# Patient Record
Sex: MALE | Race: BLACK OR AFRICAN AMERICAN | NOT HISPANIC OR LATINO | Employment: OTHER | URBAN - METROPOLITAN AREA
[De-identification: names, ages, dates, MRNs, and addresses within clinical notes are randomized per-mention and may not be internally consistent; named-entity substitution may affect disease eponyms.]

---

## 2023-03-13 ENCOUNTER — OFFICE VISIT (OUTPATIENT)
Dept: RHEUMATOLOGY | Facility: CLINIC | Age: 53
End: 2023-03-13
Payer: MEDICARE

## 2023-03-13 ENCOUNTER — HOSPITAL ENCOUNTER (OUTPATIENT)
Dept: RADIOLOGY | Facility: HOSPITAL | Age: 53
Discharge: HOME | End: 2023-03-13
Attending: INTERNAL MEDICINE
Payer: MEDICARE

## 2023-03-13 VITALS
BODY MASS INDEX: 23.25 KG/M2 | TEMPERATURE: 99 F | HEIGHT: 68 IN | SYSTOLIC BLOOD PRESSURE: 118 MMHG | WEIGHT: 153.38 LBS | HEART RATE: 60 BPM | DIASTOLIC BLOOD PRESSURE: 73 MMHG | OXYGEN SATURATION: 98 %

## 2023-03-13 DIAGNOSIS — M50.20 CERVICAL DISC HERNIATION: ICD-10-CM

## 2023-03-13 DIAGNOSIS — M50.20 CERVICAL DISC HERNIATION: Primary | ICD-10-CM

## 2023-03-13 DIAGNOSIS — M51.16 RADICULOPATHY DUE TO LUMBAR INTERVERTEBRAL DISC DISORDER: ICD-10-CM

## 2023-03-13 DIAGNOSIS — M51.26 LUMBAR DISC HERNIATION: ICD-10-CM

## 2023-03-13 DIAGNOSIS — G47.00 INSOMNIA, UNSPECIFIED TYPE: ICD-10-CM

## 2023-03-13 DIAGNOSIS — M54.12 CERVICAL RADICULOPATHY: ICD-10-CM

## 2023-03-13 PROCEDURE — 99999 PR PBB SHADOW E&M-NEW PATIENT-LVL IV: ICD-10-PCS | Mod: PBBFAC,,, | Performed by: INTERNAL MEDICINE

## 2023-03-13 PROCEDURE — 1159F MED LIST DOCD IN RCRD: CPT | Mod: CPTII,S$GLB,, | Performed by: INTERNAL MEDICINE

## 2023-03-13 PROCEDURE — 3078F DIAST BP <80 MM HG: CPT | Mod: CPTII,S$GLB,, | Performed by: INTERNAL MEDICINE

## 2023-03-13 PROCEDURE — 99999 PR PBB SHADOW E&M-NEW PATIENT-LVL IV: CPT | Mod: PBBFAC,,, | Performed by: INTERNAL MEDICINE

## 2023-03-13 PROCEDURE — 99204 OFFICE O/P NEW MOD 45 MIN: CPT | Mod: S$GLB,,, | Performed by: INTERNAL MEDICINE

## 2023-03-13 PROCEDURE — 3074F PR MOST RECENT SYSTOLIC BLOOD PRESSURE < 130 MM HG: ICD-10-PCS | Mod: CPTII,S$GLB,, | Performed by: INTERNAL MEDICINE

## 2023-03-13 PROCEDURE — 3074F SYST BP LT 130 MM HG: CPT | Mod: CPTII,S$GLB,, | Performed by: INTERNAL MEDICINE

## 2023-03-13 PROCEDURE — 99204 PR OFFICE/OUTPT VISIT, NEW, LEVL IV, 45-59 MIN: ICD-10-PCS | Mod: S$GLB,,, | Performed by: INTERNAL MEDICINE

## 2023-03-13 PROCEDURE — 72202 X-RAY EXAM SI JOINTS 3/> VWS: CPT | Mod: TC

## 2023-03-13 PROCEDURE — 3078F PR MOST RECENT DIASTOLIC BLOOD PRESSURE < 80 MM HG: ICD-10-PCS | Mod: CPTII,S$GLB,, | Performed by: INTERNAL MEDICINE

## 2023-03-13 PROCEDURE — 3008F BODY MASS INDEX DOCD: CPT | Mod: CPTII,S$GLB,, | Performed by: INTERNAL MEDICINE

## 2023-03-13 PROCEDURE — 1159F PR MEDICATION LIST DOCUMENTED IN MEDICAL RECORD: ICD-10-PCS | Mod: CPTII,S$GLB,, | Performed by: INTERNAL MEDICINE

## 2023-03-13 PROCEDURE — 3008F PR BODY MASS INDEX (BMI) DOCUMENTED: ICD-10-PCS | Mod: CPTII,S$GLB,, | Performed by: INTERNAL MEDICINE

## 2023-03-13 RX ORDER — MELOXICAM 15 MG/1
15 TABLET ORAL DAILY
COMMUNITY
Start: 2023-02-24 | End: 2023-06-09

## 2023-03-13 RX ORDER — PREGABALIN 25 MG/1
75 CAPSULE ORAL 2 TIMES DAILY
Qty: 60 CAPSULE | Refills: 5 | Status: SHIPPED | OUTPATIENT
Start: 2023-03-13 | End: 2023-06-09

## 2023-03-13 RX ORDER — OMEPRAZOLE 40 MG/1
40 CAPSULE, DELAYED RELEASE ORAL EVERY MORNING
Qty: 30 CAPSULE | Refills: 11 | Status: SHIPPED | OUTPATIENT
Start: 2023-03-13 | End: 2023-06-09 | Stop reason: SDUPTHER

## 2023-03-13 RX ORDER — BACLOFEN 10 MG/1
10 TABLET ORAL 2 TIMES DAILY
COMMUNITY
Start: 2023-02-09

## 2023-03-13 RX ORDER — QUETIAPINE FUMARATE 50 MG/1
1-2 TABLET, FILM COATED ORAL NIGHTLY
COMMUNITY
Start: 2023-02-24

## 2023-03-13 RX ORDER — GLUCOSAMINE/D3/BOSWELLIA SERRA 1500MG-400
1 TABLET ORAL DAILY
COMMUNITY

## 2023-03-13 RX ORDER — HYDROCODONE BITARTRATE AND ACETAMINOPHEN 10; 325 MG/1; MG/1
1 TABLET ORAL 2 TIMES DAILY PRN
Qty: 60 TABLET | Refills: 0 | Status: SHIPPED | OUTPATIENT
Start: 2023-03-13 | End: 2023-04-05 | Stop reason: SDUPTHER

## 2023-03-13 RX ORDER — ASPIRIN 325 MG
1250 TABLET, DELAYED RELEASE (ENTERIC COATED) ORAL
COMMUNITY
End: 2023-06-09 | Stop reason: SDUPTHER

## 2023-03-13 RX ORDER — TIZANIDINE 2 MG/1
2 TABLET ORAL NIGHTLY
Qty: 30 TABLET | Refills: 5 | Status: SHIPPED | OUTPATIENT
Start: 2023-03-13 | End: 2023-06-09

## 2023-03-13 NOTE — PROGRESS NOTES
"Subjective:       Patient ID: Christophe Mandujano is a 52 y.o. male.    Chief Complaint: Initial Visit (INITIAL VISIT. PATIENT HERE FOR ARTHRITIS. COMPLAINS OF GENERALIZED JOINT PAIN. LOWER BACK DOWN THE LEFT LEG IS THE WORSE. TROUBLE SLEEPING DUE TO THE PAIN. PATIENT HAS A  STIMULATOR IN RIGHT SIDE OF LOWER BACK AND WAS GOING TO GET ONE PLACED IN THE LEFT TODAY BUT IT WAS RESCHEDULED. PAIN TODAY 8/10.)    Pt  is complaining of joint pain involving his spine arms and legs bilaterally.  Is 9/10 in intensity dull in quality and continuous.  It is associated with a morning stiffness lasting for more than 60 minutes. Pt reports having difficulty maintaining a good night of sleep and this has been associated with myalgia of 9/10 in intensity.  This pain is dull continuous and gets worse mainly at night.  It is associated with fatigue.  No fever no chills no others.        Review of Systems   Constitutional:  Negative for appetite change, chills and fever.   HENT:  Negative for congestion, ear pain, mouth sores, nosebleeds and trouble swallowing.    Eyes:  Negative for photophobia and discharge.   Respiratory:  Negative for chest tightness and shortness of breath.    Cardiovascular:  Negative for chest pain.   Gastrointestinal:  Negative for abdominal pain and vomiting.   Endocrine: Negative.    Genitourinary:  Negative for hematuria.   Musculoskeletal:         As per HPI   Skin:  Negative for rash.   Neurological:  Negative for weakness.       Objective:   /73 (BP Location: Left arm, Patient Position: Sitting, BP Method: Medium (Automatic))   Pulse 60   Temp 98.7 °F (37.1 °C) (Oral)   Ht 5' 8" (1.727 m)   Wt 69.6 kg (153 lb 6.4 oz)   SpO2 98%   BMI 23.32 kg/m²      Physical Exam   Constitutional: He is oriented to person, place, and time. He appears well-developed and well-nourished. No distress.   HENT:   Head: Normocephalic and atraumatic.   Right Ear: External ear normal.   Left Ear: External ear normal.   Eyes: " Pupils are equal, round, and reactive to light.   Cardiovascular: Normal rate, regular rhythm and normal heart sounds.   Pulmonary/Chest: Breath sounds normal.   Abdominal: Soft. There is no abdominal tenderness.   Musculoskeletal:      Right shoulder: Tenderness present.      Left shoulder: Tenderness present.      Right elbow: Tenderness present.      Left elbow: Tenderness present.      Right wrist: Tenderness present.      Left wrist: Tenderness present.      Cervical back: Neck supple.      Right hip: Tenderness present.      Left hip: Tenderness present.      Right knee: Tenderness present.      Left knee: Tenderness present.      Right ankle: Tenderness present.      Left ankle: Tenderness present.   Lymphadenopathy:     He has no cervical adenopathy.   Neurological: He is alert and oriented to person, place, and time. He displays normal reflexes. No cranial nerve deficit or sensory deficit. He exhibits normal muscle tone. Coordination normal.   Skin: No rash noted. No erythema.   Vitals reviewed.      Right Side Rheumatological Exam     The patient is tender to palpation of the shoulder, elbow, wrist, knee, 1st PIP, 1st MCP, 2nd PIP, 2nd MCP, 3rd PIP, 3rd MCP, 4th PIP, 4th MCP, 5th PIP, hip, ankle, 1st MTP, 2nd MTP, 3rd MTP, 4th MTP, 5th MTP, 1st toe IP, 2nd toe IP, 3rd toe IP, 4th toe IP and 5th toe IP    Left Side Rheumatological Exam     The patient is tender to palpation of the shoulder, elbow, wrist, knee, 1st PIP, 1st MCP, 2nd PIP, 2nd MCP, 3rd PIP, 3rd MCP, 4th PIP, 4th MCP, 5th PIP, 5th MCP, hip, ankle, 1st MTP, 2nd MTP, 3rd MTP, 4th MTP, 5th MTP, 1st toe IP, 2nd toe IP, 3rd toe IP, 4th toe IP and 5th toe IP.       Completed Fibromyalgia exam 18/18 tender points.  No data to display     Assessment:       1. Cervical disc herniation    2. Lumbar disc herniation    3. Cervical radiculopathy    4. Radiculopathy due to lumbar intervertebral disc disorder    5. Insomnia, unspecified type             Medication List with Changes/Refills   New Medications    HYDROCODONE-ACETAMINOPHEN (NORCO)  MG PER TABLET    Take 1 tablet by mouth 2 (two) times daily as needed for Pain.       Start Date: 3/13/2023 End Date: 4/12/2023    OMEPRAZOLE (PRILOSEC) 40 MG CAPSULE    Take 1 capsule (40 mg total) by mouth every morning.       Start Date: 3/13/2023 End Date: 3/12/2024    PREGABALIN (LYRICA) 25 MG CAPSULE    Take 3 capsules (75 mg total) by mouth 2 (two) times daily.       Start Date: 3/13/2023 End Date: 9/11/2023    TIZANIDINE (ZANAFLEX) 2 MG TABLET    Take 1 tablet (2 mg total) by mouth nightly.       Start Date: 3/13/2023 End Date: 9/9/2023   Current Medications    BACLOFEN (LIORESAL) 10 MG TABLET    Take 10 mg by mouth 2 (two) times daily.       Start Date: 2/9/2023  End Date: --    CHOLECALCIFEROL, VITAMIN D3, 1,250 MCG (50,000 UNIT) CAPSULE    Take 1,250 mcg by mouth every 7 days.       Start Date: --        End Date: --    GLUCOSAMINE-D3-BOSWELLIA SERR (OSTEO BI-FLEX, 5-LOXIN,) 1,500-400-100 MG-UNIT-MG TAB    Take 1 tablet by mouth once daily.       Start Date: --        End Date: --    MELOXICAM (MOBIC) 15 MG TABLET    Take 15 mg by mouth once daily.       Start Date: 2/24/2023 End Date: --    QUETIAPINE (SEROQUEL) 50 MG TABLET    Take 1-2 tablets by mouth every evening.       Start Date: 2/24/2023 End Date: --         Plan:         Problem List Items Addressed This Visit    None  Visit Diagnoses       Cervical disc herniation    -  Primary    Relevant Medications    pregabalin (LYRICA) 25 MG capsule    omeprazole (PRILOSEC) 40 MG capsule    HYDROcodone-acetaminophen (NORCO)  mg per tablet    tiZANidine (ZANAFLEX) 2 MG tablet    Other Relevant Orders    X-Ray Sacroiliac Joints Complete    Lumbar disc herniation        Relevant Medications    pregabalin (LYRICA) 25 MG capsule    omeprazole (PRILOSEC) 40 MG capsule    HYDROcodone-acetaminophen (NORCO)  mg per tablet    tiZANidine (ZANAFLEX) 2 MG  tablet    Other Relevant Orders    X-Ray Sacroiliac Joints Complete    Cervical radiculopathy        Relevant Medications    pregabalin (LYRICA) 25 MG capsule    omeprazole (PRILOSEC) 40 MG capsule    HYDROcodone-acetaminophen (NORCO)  mg per tablet    tiZANidine (ZANAFLEX) 2 MG tablet    Other Relevant Orders    X-Ray Sacroiliac Joints Complete    Radiculopathy due to lumbar intervertebral disc disorder        Relevant Medications    pregabalin (LYRICA) 25 MG capsule    omeprazole (PRILOSEC) 40 MG capsule    HYDROcodone-acetaminophen (NORCO)  mg per tablet    tiZANidine (ZANAFLEX) 2 MG tablet    Other Relevant Orders    X-Ray Sacroiliac Joints Complete    Insomnia, unspecified type        Relevant Medications    pregabalin (LYRICA) 25 MG capsule    omeprazole (PRILOSEC) 40 MG capsule    HYDROcodone-acetaminophen (NORCO)  mg per tablet    tiZANidine (ZANAFLEX) 2 MG tablet    Other Relevant Orders    X-Ray Sacroiliac Joints Complete

## 2023-04-05 DIAGNOSIS — G47.00 INSOMNIA, UNSPECIFIED TYPE: ICD-10-CM

## 2023-04-05 DIAGNOSIS — M51.26 LUMBAR DISC HERNIATION: ICD-10-CM

## 2023-04-05 DIAGNOSIS — M50.20 CERVICAL DISC HERNIATION: ICD-10-CM

## 2023-04-05 DIAGNOSIS — M51.16 RADICULOPATHY DUE TO LUMBAR INTERVERTEBRAL DISC DISORDER: ICD-10-CM

## 2023-04-05 DIAGNOSIS — M54.12 CERVICAL RADICULOPATHY: ICD-10-CM

## 2023-04-05 RX ORDER — HYDROCODONE BITARTRATE AND ACETAMINOPHEN 10; 325 MG/1; MG/1
1 TABLET ORAL 2 TIMES DAILY PRN
Qty: 60 TABLET | Refills: 0 | Status: SHIPPED | OUTPATIENT
Start: 2023-04-05 | End: 2023-05-05 | Stop reason: SDUPTHER

## 2023-05-05 DIAGNOSIS — M51.26 LUMBAR DISC HERNIATION: ICD-10-CM

## 2023-05-05 DIAGNOSIS — M51.16 RADICULOPATHY DUE TO LUMBAR INTERVERTEBRAL DISC DISORDER: ICD-10-CM

## 2023-05-05 DIAGNOSIS — M50.20 CERVICAL DISC HERNIATION: ICD-10-CM

## 2023-05-05 DIAGNOSIS — M54.12 CERVICAL RADICULOPATHY: ICD-10-CM

## 2023-05-05 DIAGNOSIS — G47.00 INSOMNIA, UNSPECIFIED TYPE: ICD-10-CM

## 2023-05-05 RX ORDER — HYDROCODONE BITARTRATE AND ACETAMINOPHEN 10; 325 MG/1; MG/1
1 TABLET ORAL 2 TIMES DAILY PRN
Qty: 60 TABLET | Refills: 0 | Status: SHIPPED | OUTPATIENT
Start: 2023-05-05 | End: 2023-06-04

## 2023-06-09 ENCOUNTER — OFFICE VISIT (OUTPATIENT)
Dept: RHEUMATOLOGY | Facility: CLINIC | Age: 53
End: 2023-06-09
Payer: MEDICARE

## 2023-06-09 VITALS
BODY MASS INDEX: 24.89 KG/M2 | DIASTOLIC BLOOD PRESSURE: 81 MMHG | HEART RATE: 71 BPM | SYSTOLIC BLOOD PRESSURE: 127 MMHG | WEIGHT: 164.19 LBS | HEIGHT: 68 IN | OXYGEN SATURATION: 97 % | TEMPERATURE: 98 F

## 2023-06-09 DIAGNOSIS — G47.00 INSOMNIA, UNSPECIFIED TYPE: ICD-10-CM

## 2023-06-09 DIAGNOSIS — M54.16 RADICULOPATHY, LUMBAR REGION: ICD-10-CM

## 2023-06-09 DIAGNOSIS — M51.26 LUMBAR DISC HERNIATION: ICD-10-CM

## 2023-06-09 DIAGNOSIS — M50.20 CERVICAL DISC HERNIATION: ICD-10-CM

## 2023-06-09 DIAGNOSIS — M45.0 ANKYLOSING SPONDYLITIS OF MULTIPLE SITES IN SPINE: Primary | ICD-10-CM

## 2023-06-09 PROCEDURE — 3008F PR BODY MASS INDEX (BMI) DOCUMENTED: ICD-10-PCS | Mod: CPTII,S$GLB,, | Performed by: INTERNAL MEDICINE

## 2023-06-09 PROCEDURE — 3074F PR MOST RECENT SYSTOLIC BLOOD PRESSURE < 130 MM HG: ICD-10-PCS | Mod: CPTII,S$GLB,, | Performed by: INTERNAL MEDICINE

## 2023-06-09 PROCEDURE — 1159F MED LIST DOCD IN RCRD: CPT | Mod: CPTII,S$GLB,, | Performed by: INTERNAL MEDICINE

## 2023-06-09 PROCEDURE — 99999 PR PBB SHADOW E&M-EST. PATIENT-LVL III: CPT | Mod: PBBFAC,,, | Performed by: INTERNAL MEDICINE

## 2023-06-09 PROCEDURE — 3074F SYST BP LT 130 MM HG: CPT | Mod: CPTII,S$GLB,, | Performed by: INTERNAL MEDICINE

## 2023-06-09 PROCEDURE — 1159F PR MEDICATION LIST DOCUMENTED IN MEDICAL RECORD: ICD-10-PCS | Mod: CPTII,S$GLB,, | Performed by: INTERNAL MEDICINE

## 2023-06-09 PROCEDURE — 99999 PR PBB SHADOW E&M-EST. PATIENT-LVL III: ICD-10-PCS | Mod: PBBFAC,,, | Performed by: INTERNAL MEDICINE

## 2023-06-09 PROCEDURE — 3079F PR MOST RECENT DIASTOLIC BLOOD PRESSURE 80-89 MM HG: ICD-10-PCS | Mod: CPTII,S$GLB,, | Performed by: INTERNAL MEDICINE

## 2023-06-09 PROCEDURE — 3008F BODY MASS INDEX DOCD: CPT | Mod: CPTII,S$GLB,, | Performed by: INTERNAL MEDICINE

## 2023-06-09 PROCEDURE — 99214 PR OFFICE/OUTPT VISIT, EST, LEVL IV, 30-39 MIN: ICD-10-PCS | Mod: S$GLB,,, | Performed by: INTERNAL MEDICINE

## 2023-06-09 PROCEDURE — 3079F DIAST BP 80-89 MM HG: CPT | Mod: CPTII,S$GLB,, | Performed by: INTERNAL MEDICINE

## 2023-06-09 PROCEDURE — 99214 OFFICE O/P EST MOD 30 MIN: CPT | Mod: S$GLB,,, | Performed by: INTERNAL MEDICINE

## 2023-06-09 RX ORDER — HYDROCODONE BITARTRATE AND ACETAMINOPHEN 10; 325 MG/1; MG/1
1 TABLET ORAL
COMMUNITY
End: 2023-06-09 | Stop reason: SDUPTHER

## 2023-06-09 RX ORDER — ASPIRIN 325 MG
50000 TABLET, DELAYED RELEASE (ENTERIC COATED) ORAL
Qty: 5 CAPSULE | Refills: 5 | Status: SHIPPED | OUTPATIENT
Start: 2023-06-09 | End: 2023-10-02 | Stop reason: SDUPTHER

## 2023-06-09 RX ORDER — HYDROCODONE BITARTRATE AND ACETAMINOPHEN 10; 325 MG/1; MG/1
1 TABLET ORAL 3 TIMES DAILY PRN
Qty: 90 TABLET | Refills: 0 | Status: SHIPPED | OUTPATIENT
Start: 2023-06-09 | End: 2023-07-05 | Stop reason: SDUPTHER

## 2023-06-09 RX ORDER — METHOTREXATE 2.5 MG/1
10 TABLET ORAL
Qty: 20 TABLET | Refills: 5 | Status: SHIPPED | OUTPATIENT
Start: 2023-06-09 | End: 2023-10-02 | Stop reason: SDUPTHER

## 2023-06-09 RX ORDER — CELECOXIB 200 MG/1
200 CAPSULE ORAL
Qty: 30 CAPSULE | Refills: 5 | Status: SHIPPED | OUTPATIENT
Start: 2023-06-09 | End: 2023-10-02 | Stop reason: SDUPTHER

## 2023-06-09 RX ORDER — FOLIC ACID 1 MG/1
1 TABLET ORAL DAILY
Qty: 30 TABLET | Refills: 5 | Status: SHIPPED | OUTPATIENT
Start: 2023-06-09 | End: 2023-10-02 | Stop reason: SDUPTHER

## 2023-06-09 RX ORDER — OMEPRAZOLE 40 MG/1
40 CAPSULE, DELAYED RELEASE ORAL EVERY MORNING
Qty: 30 CAPSULE | Refills: 11 | Status: SHIPPED | OUTPATIENT
Start: 2023-06-09 | End: 2023-10-02 | Stop reason: SDUPTHER

## 2023-06-09 NOTE — PROGRESS NOTES
"Subjective:       Patient ID: Christophe Mandujano is a 52 y.o. male.    Chief Complaint: Pain (Pain in the waist and in the back of the legs. )    The patient is complaining of joint pain involving the MCP PIP wrist elbow shoulders hips knees and ankles bilaterally.  The pain is 8/10 in intensity dull in quality and continuous.  That is associated with a morning stiffness lasting for more than 60 minutes.  Is also having difficulty maintaining a good night of sleep.  This has been associated with myalgias.  Muscle aches are 8/10 in intensity dull in quality and continuous.  They are associated with fatigue.  No fever no chills no others.  Xrays SI  checked during this visit . SACROILIITIS MAGUI ON XRAYS       Review of Systems   Constitutional:  Negative for appetite change, chills and fever.   HENT:  Negative for congestion, ear pain, mouth sores, nosebleeds and trouble swallowing.    Eyes:  Negative for photophobia and discharge.   Respiratory:  Negative for chest tightness and shortness of breath.    Cardiovascular:  Negative for chest pain.   Gastrointestinal:  Negative for abdominal pain and vomiting.   Endocrine: Negative.    Genitourinary:  Negative for hematuria.   Musculoskeletal:         As per HPI   Skin:  Negative for rash.   Neurological:  Negative for weakness.       Objective:   /81 (BP Location: Left arm)   Pulse 71   Temp 97.7 °F (36.5 °C) (Oral)   Ht 5' 8" (1.727 m)   Wt 74.5 kg (164 lb 3.2 oz)   SpO2 97%   BMI 24.97 kg/m²      Physical Exam   Constitutional: He is oriented to person, place, and time. He appears well-developed and well-nourished. No distress.   HENT:   Head: Normocephalic and atraumatic.   Right Ear: External ear normal.   Left Ear: External ear normal.   Eyes: Pupils are equal, round, and reactive to light.   Cardiovascular: Normal rate, regular rhythm and normal heart sounds.   Pulmonary/Chest: Breath sounds normal.   Abdominal: Soft. There is no abdominal tenderness. "   Musculoskeletal:      Right shoulder: Tenderness present.      Left shoulder: Tenderness present.      Right elbow: Tenderness present.      Left elbow: Tenderness present.      Right wrist: Tenderness present.      Left wrist: Tenderness present.      Cervical back: Neck supple.      Right hip: Tenderness present.      Left hip: Tenderness present.      Right knee: Tenderness present.      Left knee: Tenderness present.      Right ankle: Tenderness present.      Left ankle: Tenderness present.   Lymphadenopathy:     He has no cervical adenopathy.   Neurological: He is alert and oriented to person, place, and time. He displays normal reflexes. No cranial nerve deficit or sensory deficit. He exhibits normal muscle tone. Coordination normal.   Skin: No rash noted. No erythema.   Vitals reviewed.      Right Side Rheumatological Exam     The patient is tender to palpation of the shoulder, elbow, wrist, knee, 1st PIP, 1st MCP, 2nd PIP, 2nd MCP, 3rd PIP, 3rd MCP, 4th PIP, 4th MCP, 5th PIP, hip, ankle, 1st MTP, 2nd MTP, 3rd MTP, 4th MTP, 5th MTP, 1st toe IP, 2nd toe IP, 3rd toe IP, 4th toe IP and 5th toe IP    Left Side Rheumatological Exam     The patient is tender to palpation of the shoulder, elbow, wrist, knee, 1st PIP, 1st MCP, 2nd PIP, 2nd MCP, 3rd PIP, 3rd MCP, 4th PIP, 4th MCP, 5th PIP, 5th MCP, hip, ankle, 1st MTP, 2nd MTP, 3rd MTP, 4th MTP, 5th MTP, 1st toe IP, 2nd toe IP, 3rd toe IP, 4th toe IP and 5th toe IP.       Completed Fibromyalgia exam 18/18 tender points.  No data to display     Assessment:       1. Ankylosing spondylitis of multiple sites in spine    2. Cervical disc herniation    3. Lumbar disc herniation    4. Insomnia, unspecified type    5. Radiculopathy, lumbar region          Medication List with Changes/Refills   New Medications    CELECOXIB (CELEBREX) 200 MG CAPSULE    Take 1 capsule (200 mg total) by mouth daily with breakfast.       Start Date: 6/9/2023  End Date: --    FOLIC ACID (FOLVITE)  1 MG TABLET    Take 1 tablet (1 mg total) by mouth once daily. After food       Start Date: 6/9/2023  End Date: 12/6/2023    METHOTREXATE 2.5 MG TAB    Take 4 tablets (10 mg total) by mouth every 7 days. EVERY    FRIDAY    TAKE 4 TAB TOGETHER AFTER SUPPER       Start Date: 6/9/2023  End Date: 12/6/2023   Current Medications    BACLOFEN (LIORESAL) 10 MG TABLET    Take 10 mg by mouth 2 (two) times daily.       Start Date: 2/9/2023  End Date: --    GLUCOSAMINE-D3-BOSWELLIA SERR 1,500-400-100 MG-UNIT-MG TAB    Take 1 tablet by mouth once daily.       Start Date: --        End Date: --    QUETIAPINE (SEROQUEL) 50 MG TABLET    Take 1-2 tablets by mouth every evening.       Start Date: 2/24/2023 End Date: --   Changed and/or Refilled Medications    Modified Medication Previous Medication    CHOLECALCIFEROL, VITAMIN D3, 1,250 MCG (50,000 UNIT) CAPSULE cholecalciferol, vitamin D3, 1,250 mcg (50,000 unit) capsule       Take 1 capsule (50,000 Units total) by mouth every 7 days.    Take 1,250 mcg by mouth every 7 days.       Start Date: 6/9/2023  End Date: --    Start Date: --        End Date: 6/9/2023    HYDROCODONE-ACETAMINOPHEN (NORCO)  MG PER TABLET HYDROcodone-acetaminophen (NORCO)  mg per tablet       Take 1 tablet by mouth 3 (three) times daily as needed for Pain.    Take 1 tablet by mouth.       Start Date: 6/9/2023  End Date: --    Start Date: --        End Date: 6/9/2023    OMEPRAZOLE (PRILOSEC) 40 MG CAPSULE omeprazole (PRILOSEC) 40 MG capsule       Take 1 capsule (40 mg total) by mouth every morning. Before breakfast    Take 1 capsule (40 mg total) by mouth every morning.       Start Date: 6/9/2023  End Date: 6/8/2024    Start Date: 3/13/2023 End Date: 6/9/2023   Discontinued Medications    MELOXICAM (MOBIC) 15 MG TABLET    Take 15 mg by mouth once daily.       Start Date: 2/24/2023 End Date: 6/9/2023    PREGABALIN (LYRICA) 25 MG CAPSULE    Take 3 capsules (75 mg total) by mouth 2 (two) times daily.        Start Date: 3/13/2023 End Date: 6/9/2023    TIZANIDINE (ZANAFLEX) 2 MG TABLET    Take 1 tablet (2 mg total) by mouth nightly.       Start Date: 3/13/2023 End Date: 6/9/2023         Plan:         Problem List Items Addressed This Visit          Neuro    Cervical disc herniation    Relevant Medications    cholecalciferol, vitamin D3, 1,250 mcg (50,000 unit) capsule    folic acid (FOLVITE) 1 MG tablet    methotrexate 2.5 MG Tab    celecoxib (CELEBREX) 200 MG capsule    omeprazole (PRILOSEC) 40 MG capsule    HYDROcodone-acetaminophen (NORCO)  mg per tablet    Other Relevant Orders    CBC Auto Differential    Comprehensive Metabolic Panel    CRP, High Sensitivity    Vitamin D    Rheumatoid Quantitative    Cyclic Citrullinated Peptide Antibody, IgG    Antinuclear Ab, HEp-2 Substrate    Hepatitis B Surface Antigen    Hepatitis C Antibody    TSH    T4, Free    HLA B27 Antigen    Lumbar disc herniation    Relevant Medications    cholecalciferol, vitamin D3, 1,250 mcg (50,000 unit) capsule    folic acid (FOLVITE) 1 MG tablet    methotrexate 2.5 MG Tab    celecoxib (CELEBREX) 200 MG capsule    omeprazole (PRILOSEC) 40 MG capsule    HYDROcodone-acetaminophen (NORCO)  mg per tablet    Other Relevant Orders    CBC Auto Differential    Comprehensive Metabolic Panel    CRP, High Sensitivity    Vitamin D    Rheumatoid Quantitative    Cyclic Citrullinated Peptide Antibody, IgG    Antinuclear Ab, HEp-2 Substrate    Hepatitis B Surface Antigen    Hepatitis C Antibody    TSH    T4, Free    HLA B27 Antigen    Radiculopathy, lumbar region    Relevant Medications    cholecalciferol, vitamin D3, 1,250 mcg (50,000 unit) capsule    folic acid (FOLVITE) 1 MG tablet    methotrexate 2.5 MG Tab    celecoxib (CELEBREX) 200 MG capsule    omeprazole (PRILOSEC) 40 MG capsule    HYDROcodone-acetaminophen (NORCO)  mg per tablet    Other Relevant Orders    CBC Auto Differential    Comprehensive Metabolic Panel    CRP, High Sensitivity     Vitamin D    Rheumatoid Quantitative    Cyclic Citrullinated Peptide Antibody, IgG    Antinuclear Ab, HEp-2 Substrate    Hepatitis B Surface Antigen    Hepatitis C Antibody    TSH    T4, Free    HLA B27 Antigen       Orthopedic    Ankylosing spondylitis of multiple sites in spine - Primary    Relevant Medications    cholecalciferol, vitamin D3, 1,250 mcg (50,000 unit) capsule    folic acid (FOLVITE) 1 MG tablet    methotrexate 2.5 MG Tab    celecoxib (CELEBREX) 200 MG capsule    omeprazole (PRILOSEC) 40 MG capsule    HYDROcodone-acetaminophen (NORCO)  mg per tablet    Other Relevant Orders    CBC Auto Differential    Comprehensive Metabolic Panel    CRP, High Sensitivity    Vitamin D    Rheumatoid Quantitative    Cyclic Citrullinated Peptide Antibody, IgG    Antinuclear Ab, HEp-2 Substrate    Hepatitis B Surface Antigen    Hepatitis C Antibody    TSH    T4, Free    HLA B27 Antigen       Other    Insomnia    Relevant Medications    cholecalciferol, vitamin D3, 1,250 mcg (50,000 unit) capsule    folic acid (FOLVITE) 1 MG tablet    methotrexate 2.5 MG Tab    celecoxib (CELEBREX) 200 MG capsule    omeprazole (PRILOSEC) 40 MG capsule    HYDROcodone-acetaminophen (NORCO)  mg per tablet    Other Relevant Orders    CBC Auto Differential    Comprehensive Metabolic Panel    CRP, High Sensitivity    Vitamin D    Rheumatoid Quantitative    Cyclic Citrullinated Peptide Antibody, IgG    Antinuclear Ab, HEp-2 Substrate    Hepatitis B Surface Antigen    Hepatitis C Antibody    TSH    T4, Free    HLA B27 Antigen

## 2023-07-05 DIAGNOSIS — M45.0 ANKYLOSING SPONDYLITIS OF MULTIPLE SITES IN SPINE: ICD-10-CM

## 2023-07-05 DIAGNOSIS — M50.20 CERVICAL DISC HERNIATION: ICD-10-CM

## 2023-07-05 DIAGNOSIS — G47.00 INSOMNIA, UNSPECIFIED TYPE: ICD-10-CM

## 2023-07-05 DIAGNOSIS — M51.26 LUMBAR DISC HERNIATION: ICD-10-CM

## 2023-07-05 DIAGNOSIS — M54.16 RADICULOPATHY, LUMBAR REGION: ICD-10-CM

## 2023-07-05 RX ORDER — HYDROCODONE BITARTRATE AND ACETAMINOPHEN 10; 325 MG/1; MG/1
1 TABLET ORAL 3 TIMES DAILY PRN
Qty: 90 TABLET | Refills: 0 | Status: SHIPPED | OUTPATIENT
Start: 2023-07-05 | End: 2023-08-04 | Stop reason: SDUPTHER

## 2023-08-04 DIAGNOSIS — M54.16 RADICULOPATHY, LUMBAR REGION: ICD-10-CM

## 2023-08-04 DIAGNOSIS — M45.0 ANKYLOSING SPONDYLITIS OF MULTIPLE SITES IN SPINE: ICD-10-CM

## 2023-08-04 DIAGNOSIS — M51.26 LUMBAR DISC HERNIATION: ICD-10-CM

## 2023-08-04 DIAGNOSIS — M50.20 CERVICAL DISC HERNIATION: ICD-10-CM

## 2023-08-04 DIAGNOSIS — G47.00 INSOMNIA, UNSPECIFIED TYPE: ICD-10-CM

## 2023-08-04 RX ORDER — HYDROCODONE BITARTRATE AND ACETAMINOPHEN 10; 325 MG/1; MG/1
1 TABLET ORAL 3 TIMES DAILY PRN
Qty: 90 TABLET | Refills: 0 | Status: SHIPPED | OUTPATIENT
Start: 2023-08-04 | End: 2023-09-05 | Stop reason: SDUPTHER

## 2023-09-05 DIAGNOSIS — M51.26 LUMBAR DISC HERNIATION: ICD-10-CM

## 2023-09-05 DIAGNOSIS — M54.16 RADICULOPATHY, LUMBAR REGION: ICD-10-CM

## 2023-09-05 DIAGNOSIS — M50.20 CERVICAL DISC HERNIATION: ICD-10-CM

## 2023-09-05 DIAGNOSIS — M45.0 ANKYLOSING SPONDYLITIS OF MULTIPLE SITES IN SPINE: ICD-10-CM

## 2023-09-05 DIAGNOSIS — G47.00 INSOMNIA, UNSPECIFIED TYPE: ICD-10-CM

## 2023-09-05 RX ORDER — HYDROCODONE BITARTRATE AND ACETAMINOPHEN 10; 325 MG/1; MG/1
1 TABLET ORAL 3 TIMES DAILY PRN
Qty: 90 TABLET | Refills: 0 | Status: SHIPPED | OUTPATIENT
Start: 2023-09-05 | End: 2023-10-02 | Stop reason: SDUPTHER

## 2023-09-05 NOTE — TELEPHONE ENCOUNTER
----- Message from Isa Rodríguez sent at 9/5/2023  8:20 AM CDT -----  Regarding: Medication refill  Patient called requesting a refill for [facility] in [place].

## 2023-09-29 PROBLEM — E55.9 VITAMIN D DEFICIENCY: Status: ACTIVE | Noted: 2023-09-29

## 2023-09-29 NOTE — ASSESSMENT & PLAN NOTE
Has noticed small improvement since starting the MTX. He is also having joint pain in MCP PIP. Morning stiffness about 60 minutes.    Increase methotrexate to 15 mg Q 7 days  Continue folic acid daily  Continue Celebrex 200 mg with breakfast  Continue omeprazole 40 mg daily    Currently on Norco 05413 mg t.i.d. p.r.n. Rx by MD    Labs ordered today for continued monitoring including Quantiferon TB Test for potential future biologic treatment

## 2023-09-29 NOTE — PROGRESS NOTES
Subjective:           Patient ID: Christophe Mandujano is a 52 y.o. male.    Chief Complaint: Follow-up (Lower back pain ,/Bilateral hip pain )      Mr. Mandujano is a 53 y/o male here for a f./u. He was initially referred for eval of generalized joint pain. Followed by [facility] for his back, he has a stimulator in his back and gets injection intermittently with not much relief. He has hx of multiple back surgeries. He established care with Dr. Chavira on 3/13/23 . Past labs: ELOISE neg, RF Neg, CCP Neg, HLA- B27 neg.  X-ray of sacroiliac showed bilateral sacroiliitis giving him the diagnosis of ankylosing spondylitis.  At his last office visit on 06/09/2023 he was initiated on methotrexate. He does report slight improvement while taking the methotrexate. He is tolerating the medication.    Today he reports joint pain involving the MCP PIP wrist elbow shoulders hips knees and ankles bilaterally.  The pain is 8/10 in intensity dull in quality and continuous.  That is associated with a morning stiffness lasting for approximately 60 minutes.  This has been associated with myalgias.  Muscle aches are 5/10 in intensity dull in quality and continuous.  They are associated with fatigue.  Denies fever,chills, or recent infections.                  Review of Systems   Constitutional:  Negative for appetite change, chills and fever.   HENT:  Negative for congestion, ear pain, mouth sores, nosebleeds and trouble swallowing.    Eyes:  Negative for photophobia and discharge.   Respiratory:  Negative for chest tightness and shortness of breath.    Cardiovascular:  Negative for chest pain.   Gastrointestinal:  Negative for abdominal pain and vomiting.   Endocrine: Negative.    Genitourinary:  Negative for hematuria.   Musculoskeletal:  Positive for arthralgias, back pain and myalgias.        As per HPI   Skin:  Negative for rash.   Neurological:  Negative for weakness.         Objective:   /76 (BP Location: Right arm, Patient Position:  "Sitting, BP Method: Medium (Automatic))   Pulse (!) 55   Temp 98.6 °F (37 °C) (Oral)   Resp 18   Ht 5' 8" (1.727 m)   Wt 71.7 kg (158 lb)   BMI 24.02 kg/m²          Physical Exam   Constitutional: He is oriented to person, place, and time. He appears well-developed and well-nourished. No distress.   HENT:   Head: Normocephalic and atraumatic.   Right Ear: External ear normal.   Left Ear: External ear normal.   Eyes: Pupils are equal, round, and reactive to light.   Cardiovascular: Normal rate.   Pulmonary/Chest: Effort normal.   Abdominal: Soft. There is no abdominal tenderness.   Musculoskeletal:      Right elbow: Tenderness present.      Left elbow: Tenderness present.      Right wrist: Tenderness present.      Left wrist: Tenderness present.      Cervical back: Neck supple.      Right knee: Tenderness present.      Left knee: Tenderness present.      Right ankle: Tenderness present.      Left ankle: Tenderness present.   Lymphadenopathy:     He has no cervical adenopathy.   Neurological: He is alert and oriented to person, place, and time. He displays normal reflexes. No cranial nerve deficit or sensory deficit. He exhibits normal muscle tone. Coordination normal.   Skin: No rash noted. No erythema.   Vitals reviewed.      Right Side Rheumatological Exam     The patient is tender to palpation of the elbow, wrist, knee, 1st PIP, 1st MCP, 2nd PIP, 2nd MCP, 3rd PIP, 3rd MCP, 4th PIP, 4th MCP, 5th PIP, 5th MCP, ankle, 1st MTP, 2nd MTP, 3rd MTP, 4th MTP and 5th MTP    Left Side Rheumatological Exam     The patient is tender to palpation of the elbow, wrist, knee, 1st PIP, 1st MCP, 2nd PIP, 2nd MCP, 3rd PIP, 3rd MCP, 4th PIP, 4th MCP, 5th PIP, 5th MCP, ankle, 1st MTP, 2nd MTP, 3rd MTP, 4th MTP and 5th MTP.           Completed Fibromyalgia exam 18/18 tender points.    No data to display     Assessment:         Medication List with Changes/Refills   Current Medications    BACLOFEN (LIORESAL) 10 MG TABLET    Take 10 " mg by mouth 2 (two) times daily.       Start Date: 2/9/2023  End Date: --    GLUCOSAMINE-D3-BOSWELLIA SERR 1,500-400-100 MG-UNIT-MG TAB    Take 1 tablet by mouth once daily.       Start Date: --        End Date: --    HYDROCODONE-ACETAMINOPHEN (NORCO)  MG PER TABLET    Take 1 tablet by mouth 3 (three) times daily as needed for Pain.       Start Date: 9/5/2023  End Date: --    PREGABALIN (LYRICA) 75 MG CAPSULE    Take 75 mg by mouth 2 (two) times daily.       Start Date: 5/18/2023 End Date: --    QUETIAPINE (SEROQUEL) 50 MG TABLET    Take 1-2 tablets by mouth every evening.       Start Date: 2/24/2023 End Date: --    SILDENAFIL (VIAGRA) 100 MG TABLET    Take 100 mg by mouth once daily.       Start Date: 8/8/2023  End Date: --   Changed and/or Refilled Medications    Modified Medication Previous Medication    CELECOXIB (CELEBREX) 200 MG CAPSULE celecoxib (CELEBREX) 200 MG capsule       Take 1 capsule (200 mg total) by mouth daily with breakfast.    Take 1 capsule (200 mg total) by mouth daily with breakfast.       Start Date: 10/2/2023 End Date: --    Start Date: 6/9/2023  End Date: 10/2/2023    CHOLECALCIFEROL, VITAMIN D3, 1,250 MCG (50,000 UNIT) CAPSULE cholecalciferol, vitamin D3, 1,250 mcg (50,000 unit) capsule       Take 1 capsule (50,000 Units total) by mouth every 7 days.    Take 1 capsule (50,000 Units total) by mouth every 7 days.       Start Date: 10/2/2023 End Date: --    Start Date: 6/9/2023  End Date: 10/2/2023    FOLIC ACID (FOLVITE) 1 MG TABLET folic acid (FOLVITE) 1 MG tablet       Take 1 tablet (1 mg total) by mouth once daily. After food    Take 1 tablet (1 mg total) by mouth once daily. After food       Start Date: 10/2/2023 End Date: 3/30/2024    Start Date: 6/9/2023  End Date: 10/2/2023    METHOTREXATE 2.5 MG TAB methotrexate 2.5 MG Tab       Take 6 tablets (15 mg total) by mouth every 7 days. On Fridays Take 6 tabs together after supper    Take 4 tablets (10 mg total) by mouth every 7 days.  EVERY    FRIDAY    TAKE 4 TAB TOGETHER AFTER SUPPER       Start Date: 10/2/2023 End Date: --    Start Date: 6/9/2023  End Date: 10/2/2023    OMEPRAZOLE (PRILOSEC) 40 MG CAPSULE omeprazole (PRILOSEC) 40 MG capsule       Take 1 capsule (40 mg total) by mouth every morning. Before breakfast    Take 1 capsule (40 mg total) by mouth every morning. Before breakfast       Start Date: 10/2/2023 End Date: 10/1/2024    Start Date: 6/9/2023  End Date: 10/2/2023         ICD-10-CM ICD-9-CM   1. Ankylosing spondylitis of multiple sites in spine  M45.0 720.0   2. Radiculopathy, lumbar region  M54.16 724.4   3. Vitamin D deficiency  E55.9 268.9   4. Cervical disc herniation  M50.20 722.0   5. Lumbar disc herniation  M51.26 722.10   6. Insomnia, unspecified type  G47.00 780.52           Plan:       1. Ankylosing spondylitis of multiple sites in spine  Overview:  Xrays SI  checked . SACROILIITIS MAGUI ON XRAYS    Assessment & Plan:  Has noticed small improvement since starting the MTX. He is also having joint pain in MCP PIP. Morning stiffness about 60 minutes.    Increase methotrexate to 15 mg Q 7 days  Continue folic acid daily  Continue Celebrex 200 mg with breakfast  Continue omeprazole 40 mg daily    Currently on Norco 23007 mg t.i.d. p.r.n. Rx by MD    Labs ordered today for continued monitoring including Quantiferon TB Test for potential future biologic treatment      Orders:  -     celecoxib (CELEBREX) 200 MG capsule; Take 1 capsule (200 mg total) by mouth daily with breakfast.  Dispense: 30 capsule; Refill: 5  -     methotrexate 2.5 MG Tab; Take 6 tablets (15 mg total) by mouth every 7 days. On Fridays Take 6 tabs together after supper  Dispense: 30 tablet; Refill: 5  -     folic acid (FOLVITE) 1 MG tablet; Take 1 tablet (1 mg total) by mouth once daily. After food  Dispense: 30 tablet; Refill: 5  -     omeprazole (PRILOSEC) 40 MG capsule; Take 1 capsule (40 mg total) by mouth every morning. Before breakfast  Dispense: 30  capsule; Refill: 11  -     CBC Auto Differential; Future; Expected date: 10/02/2023  -     Comprehensive Metabolic Panel; Future; Expected date: 10/02/2023  -     C-Reactive Protein; Future; Expected date: 10/02/2023  -     Quantiferon Gold TB; Future; Expected date: 10/02/2023    2. Radiculopathy, lumbar region  Assessment & Plan:  Continue baclofen 10 mg b.i.d.    Orders:  -     CBC Auto Differential; Future; Expected date: 10/02/2023  -     Comprehensive Metabolic Panel; Future; Expected date: 10/02/2023  -     C-Reactive Protein; Future; Expected date: 10/02/2023  -     Quantiferon Gold TB; Future; Expected date: 10/02/2023    3. Vitamin D deficiency  -     cholecalciferol, vitamin D3, 1,250 mcg (50,000 unit) capsule; Take 1 capsule (50,000 Units total) by mouth every 7 days.  Dispense: 5 capsule; Refill: 5    4. Cervical disc herniation    5. Lumbar disc herniation    6. Insomnia, unspecified type  Assessment & Plan:  Avoid caffeine, alcohol and stimulants.  Power down electronic devices at least one hour prior to bedtime.  Keep room dark; use eye mask or relaxation sound machine to promote rest.  Sleep hygiene refers to actions that tend to improve and maintain good sleep:  Sleep as long as necessary to feel rested (usually seven to eight hours for adults) and then get out of bed  Maintain a regular sleep schedule, particularly a regular wake-up time in the morning  Avoid smoking or other nicotine intake, particularly during the evening

## 2023-10-02 ENCOUNTER — OFFICE VISIT (OUTPATIENT)
Dept: RHEUMATOLOGY | Facility: CLINIC | Age: 53
End: 2023-10-02
Payer: MEDICARE

## 2023-10-02 ENCOUNTER — TELEPHONE (OUTPATIENT)
Dept: RHEUMATOLOGY | Facility: CLINIC | Age: 53
End: 2023-10-02

## 2023-10-02 VITALS
TEMPERATURE: 99 F | DIASTOLIC BLOOD PRESSURE: 76 MMHG | HEIGHT: 68 IN | HEART RATE: 55 BPM | RESPIRATION RATE: 18 BRPM | SYSTOLIC BLOOD PRESSURE: 125 MMHG | WEIGHT: 158 LBS | BODY MASS INDEX: 23.95 KG/M2

## 2023-10-02 DIAGNOSIS — G47.00 INSOMNIA, UNSPECIFIED TYPE: ICD-10-CM

## 2023-10-02 DIAGNOSIS — M45.0 ANKYLOSING SPONDYLITIS OF MULTIPLE SITES IN SPINE: ICD-10-CM

## 2023-10-02 DIAGNOSIS — M51.26 LUMBAR DISC HERNIATION: ICD-10-CM

## 2023-10-02 DIAGNOSIS — M45.0 ANKYLOSING SPONDYLITIS OF MULTIPLE SITES IN SPINE: Primary | ICD-10-CM

## 2023-10-02 DIAGNOSIS — M50.20 CERVICAL DISC HERNIATION: ICD-10-CM

## 2023-10-02 DIAGNOSIS — E55.9 VITAMIN D DEFICIENCY: ICD-10-CM

## 2023-10-02 DIAGNOSIS — M54.16 RADICULOPATHY, LUMBAR REGION: ICD-10-CM

## 2023-10-02 PROCEDURE — 1159F PR MEDICATION LIST DOCUMENTED IN MEDICAL RECORD: ICD-10-PCS | Mod: CPTII,S$GLB,,

## 2023-10-02 PROCEDURE — 99214 OFFICE O/P EST MOD 30 MIN: CPT | Mod: S$GLB,,,

## 2023-10-02 PROCEDURE — 3008F PR BODY MASS INDEX (BMI) DOCUMENTED: ICD-10-PCS | Mod: CPTII,S$GLB,,

## 2023-10-02 PROCEDURE — 3074F SYST BP LT 130 MM HG: CPT | Mod: CPTII,S$GLB,,

## 2023-10-02 PROCEDURE — 3078F DIAST BP <80 MM HG: CPT | Mod: CPTII,S$GLB,,

## 2023-10-02 PROCEDURE — 99999 PR PBB SHADOW E&M-EST. PATIENT-LVL III: CPT | Mod: PBBFAC,,,

## 2023-10-02 PROCEDURE — 99999 PR PBB SHADOW E&M-EST. PATIENT-LVL III: ICD-10-PCS | Mod: PBBFAC,,,

## 2023-10-02 PROCEDURE — 99214 PR OFFICE/OUTPT VISIT, EST, LEVL IV, 30-39 MIN: ICD-10-PCS | Mod: S$GLB,,,

## 2023-10-02 PROCEDURE — 3078F PR MOST RECENT DIASTOLIC BLOOD PRESSURE < 80 MM HG: ICD-10-PCS | Mod: CPTII,S$GLB,,

## 2023-10-02 PROCEDURE — 3008F BODY MASS INDEX DOCD: CPT | Mod: CPTII,S$GLB,,

## 2023-10-02 PROCEDURE — 1159F MED LIST DOCD IN RCRD: CPT | Mod: CPTII,S$GLB,,

## 2023-10-02 PROCEDURE — 3074F PR MOST RECENT SYSTOLIC BLOOD PRESSURE < 130 MM HG: ICD-10-PCS | Mod: CPTII,S$GLB,,

## 2023-10-02 RX ORDER — METHOTREXATE 2.5 MG/1
15 TABLET ORAL
Qty: 30 TABLET | Refills: 5 | Status: SHIPPED | OUTPATIENT
Start: 2023-10-02

## 2023-10-02 RX ORDER — FOLIC ACID 1 MG/1
1 TABLET ORAL DAILY
Qty: 30 TABLET | Refills: 5 | Status: SHIPPED | OUTPATIENT
Start: 2023-10-02 | End: 2024-03-30

## 2023-10-02 RX ORDER — CELECOXIB 200 MG/1
200 CAPSULE ORAL
Qty: 30 CAPSULE | Refills: 5 | Status: SHIPPED | OUTPATIENT
Start: 2023-10-02

## 2023-10-02 RX ORDER — ASPIRIN 325 MG
50000 TABLET, DELAYED RELEASE (ENTERIC COATED) ORAL
Qty: 5 CAPSULE | Refills: 5 | Status: SHIPPED | OUTPATIENT
Start: 2023-10-02

## 2023-10-02 RX ORDER — SILDENAFIL 100 MG/1
100 TABLET, FILM COATED ORAL DAILY
COMMUNITY
Start: 2023-08-08

## 2023-10-02 RX ORDER — OMEPRAZOLE 40 MG/1
40 CAPSULE, DELAYED RELEASE ORAL EVERY MORNING
Qty: 30 CAPSULE | Refills: 11 | Status: SHIPPED | OUTPATIENT
Start: 2023-10-02 | End: 2024-10-01

## 2023-10-02 RX ORDER — PREGABALIN 75 MG/1
75 CAPSULE ORAL 2 TIMES DAILY
COMMUNITY
Start: 2023-05-18 | End: 2023-10-02 | Stop reason: SDUPTHER

## 2023-10-02 NOTE — TELEPHONE ENCOUNTER
Results reviewed- He still has anemia but it has improved since last time it was checked. Also, His WBC has increased which is good but since his neutrophils are low he should still avoid contact with known sick people. We will continue monitoring that though. Kidney function, liver enzymes and inflammation level are all normal. He can continue taking all of his medications.

## 2023-10-04 RX ORDER — PREGABALIN 75 MG/1
75 CAPSULE ORAL 2 TIMES DAILY
Qty: 60 CAPSULE | Refills: 5 | Status: SHIPPED | OUTPATIENT
Start: 2023-10-04

## 2023-10-04 RX ORDER — HYDROCODONE BITARTRATE AND ACETAMINOPHEN 10; 325 MG/1; MG/1
1 TABLET ORAL 3 TIMES DAILY PRN
Qty: 90 TABLET | Refills: 0 | Status: SHIPPED | OUTPATIENT
Start: 2023-10-04 | End: 2023-11-02 | Stop reason: SDUPTHER

## 2023-11-02 DIAGNOSIS — G47.00 INSOMNIA, UNSPECIFIED TYPE: ICD-10-CM

## 2023-11-02 DIAGNOSIS — M54.16 RADICULOPATHY, LUMBAR REGION: ICD-10-CM

## 2023-11-02 DIAGNOSIS — M45.0 ANKYLOSING SPONDYLITIS OF MULTIPLE SITES IN SPINE: ICD-10-CM

## 2023-11-02 DIAGNOSIS — M51.26 LUMBAR DISC HERNIATION: ICD-10-CM

## 2023-11-02 DIAGNOSIS — M50.20 CERVICAL DISC HERNIATION: ICD-10-CM

## 2023-11-02 RX ORDER — HYDROCODONE BITARTRATE AND ACETAMINOPHEN 10; 325 MG/1; MG/1
1 TABLET ORAL 3 TIMES DAILY PRN
Qty: 90 TABLET | Refills: 0 | Status: SHIPPED | OUTPATIENT
Start: 2023-11-02